# Patient Record
Sex: MALE | URBAN - METROPOLITAN AREA
[De-identification: names, ages, dates, MRNs, and addresses within clinical notes are randomized per-mention and may not be internally consistent; named-entity substitution may affect disease eponyms.]

---

## 2023-02-27 ENCOUNTER — OFFICE VISIT (OUTPATIENT)
Dept: URBAN - METROPOLITAN AREA CLINIC 33 | Facility: CLINIC | Age: 53
End: 2023-02-27
Payer: COMMERCIAL

## 2023-02-27 VITALS
BODY MASS INDEX: 26.48 KG/M2 | DIASTOLIC BLOOD PRESSURE: 77 MMHG | OXYGEN SATURATION: 98 % | HEART RATE: 51 BPM | SYSTOLIC BLOOD PRESSURE: 118 MMHG | WEIGHT: 185 LBS | HEIGHT: 70 IN

## 2023-02-27 DIAGNOSIS — K70.9 ALCOHOLIC LIVER DISEASE: ICD-10-CM

## 2023-02-27 DIAGNOSIS — D64.89 ANEMIA DUE TO OTHER CAUSE, NOT CLASSIFIED: ICD-10-CM

## 2023-02-27 PROBLEM — 271737000: Status: ACTIVE | Noted: 2023-02-27

## 2023-02-27 PROBLEM — 41309000: Status: ACTIVE | Noted: 2023-02-27

## 2023-02-27 PROCEDURE — 99204 OFFICE O/P NEW MOD 45 MIN: CPT | Performed by: INTERNAL MEDICINE

## 2023-02-27 RX ORDER — SPIRONOLACTONE 25 MG/1
1 TABLET TABLET, FILM COATED ORAL
Status: ACTIVE | COMMUNITY

## 2023-02-27 RX ORDER — LEVOTHYROXINE SODIUM 88 UG/1
1 TABLET IN THE MORNING ON AN EMPTY STOMACH TABLET ORAL ONCE A DAY
Status: ACTIVE | COMMUNITY

## 2023-02-27 RX ORDER — ATENOLOL 25 MG/1
1 TABLET TABLET ORAL ONCE A DAY
Status: ACTIVE | COMMUNITY

## 2023-02-27 NOTE — HPI-ABNORMAL LABS
53 y/o male patient presents today for a consultation for abnormal labs, referred by his PCP -  DAHIANA Wheeler .  He denies any previous history of elevated liver enzymes. Patient admits a history of being an alcoholic. He drank 18 drinks for 6 weeks till he entered rehab. Patient states he has enrolled in a detox center where he received treatment for two weeks. Patient states he is currently living in a sobriety center. Patient states his last alcoholic drink was 12.05.2022.    He currently denies any associated symptoms of jaundice, fatigue, dizziness, RUQ pain, bloating, easy bruising or chills. Patient admits symptoms of fatigue, he states his fatigue may be triggered from not having enough rest. Patient states he had a severe allergic reaction to Gabapentin. Patiemt states after consuming the medication he experience severe symptoms of Jaundice and edema in his legs. Patient states after stopping the medication his returned back to normal.   He admits having an ultrasound completed on 01.06.2023 revealing Enlarge, nodular liver woth coarsened echotexture and small volume ascites concerning for cirrhosis. If further assessment of the liver is desired recommend MRI, as MRI is much more sensitive for underlying liver lesions than ultrasounfd, especially in the setting of cirrhosis. Borderline gallbladder wall thickening. Other preserved appearance to the gallbladder with no findigns to suggest biliary obstruction. Ifcomplimentary assessment is desired consider MRI.    Most recent labs were performed 12.29.2022 with findings of WBC - 12.9 (H), RBC - 3.68 (L), Hgb - 12.8 (L), HCT - 35.3 (L), MCH - 34.8 (H), MCHC - 36.1 (H), Neutrophils - 10.1 (H), Glucose - 148 (H), Sodium - 132 (L), Albumin - 3.1 (L), Globulin - 4.6 (H), A/G Ratio - 0.7 (L), Bilirubin - 10.9 (H), ALK Phos - 154 (H), AST - 293 (H), ALT - 149 (H), Vitamin B12 2000 (H), TSH - 5.880 (H), GGT - 144 (H).   Patient also had some labs completed on 01.28.2023 revealing GGT: 117H, Hgb A1c: 5.7H, Glucose: 103H, Creatinine: 0.72L, Calcium: 10.05H, A/G Ratio: 1.0L, Bilirubin, Total: 3.5H, Alk Phosphatase: 145H, AST: 84H, ALT: 67H, all other labs were normal.    RISK FACTORS: Denies Alcohol use, drug use, travel outside the [place], NSAIDs, protein supplements, tattoos, piercings,  service, blood transfusion, family history of liver disease or cancer, personal exposure to liver diseases, residential, rehab

## 2023-03-28 ENCOUNTER — OFFICE VISIT (OUTPATIENT)
Dept: URBAN - METROPOLITAN AREA CLINIC 31 | Facility: CLINIC | Age: 53
End: 2023-03-28

## 2023-05-01 LAB
FERRITIN, SERUM: 299
FOLATE, RBC: 740
HEPATITIS A AB, TOTAL: REACTIVE
HEPATITIS B CORE AB TOTAL: (no result)
HEPATITIS B SURFACE ANTIGEN: (no result)
HEPATITIS C ANTIBODY: (no result)
INDEX: 0.14
INR: 1.2
IRON BIND.CAP.(TIBC): 368
IRON SATURATION: 31
IRON: 113
PT: 12.4
RETICULOCYTE COUNT: 1.4
RETICULOCYTE, ABSOLUTE: (no result)
T-TRANSGLUTAMINASE (TTG) IGA: <1
T-TRANSGLUTAMINASE (TTG) IGG: <1
VITAMIN B12: 739

## 2023-05-03 ENCOUNTER — LAB OUTSIDE AN ENCOUNTER (OUTPATIENT)
Dept: URBAN - METROPOLITAN AREA CLINIC 35 | Facility: CLINIC | Age: 53
End: 2023-05-03

## 2023-05-04 LAB
A/G RATIO: 1.2
ALBUMIN: 3.8
ALKALINE PHOSPHATASE: 144
ALT (SGPT): 37
AST (SGOT): 45
BILIRUBIN, TOTAL: 2.1
BUN/CREATININE RATIO: 30
BUN: 18
CALCIUM: 10.1
CARBON DIOXIDE, TOTAL: 26
CHLORIDE: 102
CREATININE: 0.6
EGFR: 115
GLOBULIN, TOTAL: 3.3
GLUCOSE: 79
POTASSIUM: 4.5
PROTEIN, TOTAL: 7.1
SODIUM: 138

## 2023-05-09 ENCOUNTER — OFFICE VISIT (OUTPATIENT)
Dept: URBAN - METROPOLITAN AREA CLINIC 31 | Facility: CLINIC | Age: 53
End: 2023-05-09
Payer: COMMERCIAL

## 2023-05-09 VITALS
HEART RATE: 61 BPM | DIASTOLIC BLOOD PRESSURE: 70 MMHG | WEIGHT: 182.4 LBS | BODY MASS INDEX: 26.11 KG/M2 | HEIGHT: 70 IN | SYSTOLIC BLOOD PRESSURE: 120 MMHG | OXYGEN SATURATION: 99 %

## 2023-05-09 DIAGNOSIS — Z12.12 ENCOUNTER FOR SCREENING FOR MALIGNANT NEOPLASM OF RECTUM: ICD-10-CM

## 2023-05-09 DIAGNOSIS — K70.30 ALCOHOLIC CIRRHOSIS OF LIVER WITHOUT ASCITES: ICD-10-CM

## 2023-05-09 DIAGNOSIS — D64.89 ANEMIA DUE TO OTHER CAUSE, NOT CLASSIFIED: ICD-10-CM

## 2023-05-09 DIAGNOSIS — K70.9 ALCOHOLIC LIVER DISEASE: ICD-10-CM

## 2023-05-09 DIAGNOSIS — Z12.11 ENCOUNTER FOR SCREENING FOR MALIGNANT NEOPLASM OF COLON: ICD-10-CM

## 2023-05-09 PROBLEM — 420054005: Status: ACTIVE | Noted: 2023-05-09

## 2023-05-09 PROBLEM — 305058001: Status: ACTIVE | Noted: 2023-05-09

## 2023-05-09 PROCEDURE — 99214 OFFICE O/P EST MOD 30 MIN: CPT | Performed by: INTERNAL MEDICINE

## 2023-05-09 RX ORDER — ATENOLOL 25 MG/1
1 TABLET TABLET ORAL ONCE A DAY
Status: ACTIVE | COMMUNITY

## 2023-05-09 RX ORDER — SPIRONOLACTONE 25 MG/1
1 TABLET TABLET, FILM COATED ORAL
Status: ACTIVE | COMMUNITY

## 2023-05-09 RX ORDER — LEVOTHYROXINE SODIUM 88 UG/1
1 TABLET IN THE MORNING ON AN EMPTY STOMACH TABLET ORAL ONCE A DAY
Status: ACTIVE | COMMUNITY

## 2023-05-09 RX ORDER — SODIUM, POTASSIUM,MAG SULFATES 17.5-3.13G
177ML SOLUTION, RECONSTITUTED, ORAL ORAL
Qty: 1 KIT | Refills: 0 | OUTPATIENT
Start: 2023-05-09 | End: 2023-05-10

## 2023-05-09 NOTE — HPI-CIRRHOSIS
He denies any recent episodes of fatigue, easy bruising, decreased appetite, nausea, vomiting, edema, unintentional weight loss, or jaundice. Denies any shortness of breath.    Most recent labs were completed 05.01.2023 and 05.04.2023 which reevealed Creatinine: 0.60L, BUN/Creatinie Ratio: 30H, Bilirubin:2.1H, AST: 45H, INR: 1.2H, PT: 12.4H, Hepatitis A AB, total: Reactive, all other labs were normal.       MELD Score: 11 Complications of liver disease include:  Jaundice:  Yes/No Hepatic Encephalopathy (lose of Brain function due to liver not working) : Yes/No  Ascites (abdominal fluid): Yes/No  Spontaneous Bacterial Peritonitis (ascites fluid infection): Yes/No  Variceal hemorrhage (bleeding within the veins due to high B/P): Yes/No  Portal Vein Thrombosis (blockage or narrowing of the portal vein): Yes/No  Muscle Mass Loss: Yes/No  Weight Loss: Yes/No  Sleeping habits: -- hours per night.

## 2023-05-09 NOTE — HPI-ELEVATED LIVER ENZYMES
Patient present today for 3 month follow-up of  elevated liver enzymes. He denies any associated symptoms of  jaundice, fatigue, dizziness, RUQ pain, bloating, easy bruising or chills. Patient admits symptoms of joint pain and muscle fatigue. he states the joint pain and muscle fatigue is mainly present in his quads and shoulders.  Patient states he noticed the symptoms aout 5-6 weeks ago. Patient states he was diagnose with Gilbert's Syndrome back in 1984.   Most recent labs were performed 05.01.2023 and 05.04.2023 which reevealed Creatinine: 0.60L, BUN/Creatinie Ratio: 30H, Bilirubin:2.1H, AST: 45H, INR: 1.2H, PT: 12.4H, Hepatitis A AB, total: Reactive, all other labs were normal.   (Last Visit 02.27.2023) 51 y/o male patient presents today for a consultation for abnormal labs, referred by his PCP -  DAHIANA Wheeler .  He denies any previous history of elevated liver enzymes. Patient admits a history of being an alcoholic. He drank 18 drinks for 6 weeks till he entered rehab. Patient states he has enrolled in a detox center where he received treatment for two weeks. Patient states he is currently living in a sobriety center. Patient states his last alcoholic drink was 12.05.2022.    He currently denies any associated symptoms of jaundice, fatigue, dizziness, RUQ pain, bloating, easy bruising or chills. Patient admits symptoms of fatigue, he states his fatigue may be triggered from not having enough rest. Patient states he had a severe allergic reaction to Gabapentin. Patiemt states after consuming the medication he experience severe symptoms of Jaundice and edema in his legs. Patient states after stopping the medication his returned back to normal.   He admits having an ultrasound completed on 01.06.2023 revealing Enlarge, nodular liver woth coarsened echotexture and small volume ascites concerning for cirrhosis. If further assessment of the liver is desired recommend MRI, as MRI is much more sensitive for underlying liver lesions than ultrasounfd, especially in the setting of cirrhosis. Borderline gallbladder wall thickening. Other preserved appearance to the gallbladder with no findigns to suggest biliary obstruction. Ifcomplimentary assessment is desired consider MRI.    Most recent labs were performed 12.29.2022 with findings of WBC - 12.9 (H), RBC - 3.68 (L), Hgb - 12.8 (L), HCT - 35.3 (L), MCH - 34.8 (H), MCHC - 36.1 (H), Neutrophils - 10.1 (H), Glucose - 148 (H), Sodium - 132 (L), Albumin - 3.1 (L), Globulin - 4.6 (H), A/G Ratio - 0.7 (L), Bilirubin - 10.9 (H), ALK Phos - 154 (H), AST - 293 (H), ALT - 149 (H), Vitamin B12 2000 (H), TSH - 5.880 (H), GGT - 144 (H).   Patient also had some labs completed on 01.28.2023 revealing GGT: 117H, Hgb A1c: 5.7H, Glucose: 103H, Creatinine: 0.72L, Calcium: 10.05H, A/G Ratio: 1.0L, Bilirubin, Total: 3.5H, Alk Phosphatase: 145H, AST: 84H, ALT: 67H, all other labs were normal.    RISK FACTORS: Denies Alcohol use, drug use, travel outside the [place], NSAIDs, protein supplements, tattoos, piercings,  service, blood transfusion, family history of liver disease or cancer, personal exposure to liver diseases, correction, rehab

## 2023-05-11 ENCOUNTER — TELEPHONE ENCOUNTER (OUTPATIENT)
Dept: URBAN - METROPOLITAN AREA CLINIC 36 | Facility: CLINIC | Age: 53
End: 2023-05-11

## 2023-05-11 ENCOUNTER — TELEPHONE ENCOUNTER (OUTPATIENT)
Dept: URBAN - METROPOLITAN AREA CLINIC 33 | Facility: CLINIC | Age: 53
End: 2023-05-11

## 2023-05-17 ENCOUNTER — TELEPHONE ENCOUNTER (OUTPATIENT)
Dept: URBAN - METROPOLITAN AREA CLINIC 31 | Facility: CLINIC | Age: 53
End: 2023-05-17

## 2023-05-17 RX ORDER — SODIUM, POTASSIUM,MAG SULFATES 17.5-3.13G
177ML SOLUTION, RECONSTITUTED, ORAL ORAL ONCE A DAY
Qty: 354 ML | Refills: 0
Start: 2023-05-09 | End: 2023-05-21

## 2023-06-06 ENCOUNTER — OFFICE VISIT (OUTPATIENT)
Dept: URBAN - METROPOLITAN AREA MEDICAL CENTER 10 | Facility: MEDICAL CENTER | Age: 53
End: 2023-06-06
Payer: COMMERCIAL

## 2023-06-06 DIAGNOSIS — K74.69 CIRRHOSIS, CRYPTOGENIC: ICD-10-CM

## 2023-06-06 DIAGNOSIS — K31.89 ACQUIRED DEFORMITY OF DUODENUM: ICD-10-CM

## 2023-06-06 DIAGNOSIS — Z12.11 COLON CANCER SCREENING: ICD-10-CM

## 2023-06-06 DIAGNOSIS — I85.10 ESOPH VARICE OTHER DIS: ICD-10-CM

## 2023-06-06 DIAGNOSIS — D12.0 ADENOMA OF CECUM: ICD-10-CM

## 2023-06-06 PROCEDURE — 45380 COLONOSCOPY AND BIOPSY: CPT | Performed by: INTERNAL MEDICINE

## 2023-06-06 PROCEDURE — 43239 EGD BIOPSY SINGLE/MULTIPLE: CPT | Performed by: INTERNAL MEDICINE

## 2023-07-11 ENCOUNTER — OFFICE VISIT (OUTPATIENT)
Dept: URBAN - METROPOLITAN AREA CLINIC 31 | Facility: CLINIC | Age: 53
End: 2023-07-11

## 2023-07-11 ENCOUNTER — TELEPHONE ENCOUNTER (OUTPATIENT)
Dept: URBAN - METROPOLITAN AREA CLINIC 35 | Facility: CLINIC | Age: 53
End: 2023-07-11

## 2023-07-11 RX ORDER — ATENOLOL 25 MG/1
1 TABLET TABLET ORAL ONCE A DAY
Status: ACTIVE | COMMUNITY

## 2023-07-11 RX ORDER — LEVOTHYROXINE SODIUM 88 UG/1
1 TABLET IN THE MORNING ON AN EMPTY STOMACH TABLET ORAL ONCE A DAY
Status: ACTIVE | COMMUNITY

## 2023-07-11 RX ORDER — SPIRONOLACTONE 25 MG/1
1 TABLET TABLET, FILM COATED ORAL
Status: ACTIVE | COMMUNITY

## 2023-07-11 NOTE — HPI-CIRRHOSIS
Patient states he has/not had any recent episodes of fatigue, easy bruising, decreased appetite, nausea, vomiting, edema, unintentional weight loss, or jaundice.  Admits/Denies any shortness of breath.    Most recent labs were completed --.    MELD Score:  Complications of liver disease include:  Jaundice:   Hepatic Encephalopathy (lose of Brain function due to liver not working) : Yes/No  Ascites (abdominal fluid): Yes/No  Spontaneous Bacterial Peritonitis (ascites fluid infection): Yes/No  Variceal hemorrhage (bleeding within the veins due to high B/P): Yes/No  Portal Vein Thrombosis (blockage or narrowing of the portal vein): Yes/No  Muscle Mass Loss: Yes/No  Weight Loss: Yes/No  Sleeping habits: -- hours per night.      (Last Visit 05.09.2023) He denies any recent episodes of fatigue, easy bruising, decreased appetite, nausea, vomiting, edema, unintentional weight loss, or jaundice. Denies any shortness of breath.    Most recent labs were completed 05.01.2023 and 05.04.2023 which reevealed Creatinine: 0.60L, BUN/Creatinie Ratio: 30H, Bilirubin:2.1H, AST: 45H, INR: 1.2H, PT: 12.4H, Hepatitis A AB, total: Reactive, all other labs were normal.       MELD Score: 11 Complications of liver disease include:  Jaundice:  Yes/No Hepatic Encephalopathy (lose of Brain function due to liver not working) : Yes/No  Ascites (abdominal fluid): Yes/No  Spontaneous Bacterial Peritonitis (ascites fluid infection): Yes/No  Variceal hemorrhage (bleeding within the veins due to high B/P): Yes/No  Portal Vein Thrombosis (blockage or narrowing of the portal vein): Yes/No  Muscle Mass Loss: Yes/No  Weight Loss: Yes/No  Sleeping habits: -- hours per night.

## 2023-07-11 NOTE — HPI-COLONOSCOPY FOLLOWUP
Patient presents today for a follow up from his colonoscopy. He admits/denies any complications after his procedure. Currently reports -- bowel movements per day, with/out strain. His stools are _  with/out the presence of blood, mucus, and melena. Admits/Denies any pruritus ani or rectal pain.

## 2023-07-11 NOTE — HPI-ENDOSCOPY (EGD) FOLLOWUP
A catheter (Catheter 6fr Jr5 Crv 100cm [facility])  was used to cross the aortic valve and perform left ventriculography by hand injection.  He admits/denies any complications after his procedure. Since his procedure he admits/denies any recent episodes of dysphagia, globus, a change in appetite or bowel habits.

## 2023-07-11 NOTE — HPI-ELEVATED LIVER ENZYMES
He admits/denies any recent episodes of jaundice, fatigue, dizziness, RUQ pain, bloating, easy bruising or chills.   Most recent labs were performed (--).    (Last Visit 05.09.2023) Patient present today for 3 month follow-up of  elevated liver enzymes. He denies any associated symptoms of  jaundice, fatigue, dizziness, RUQ pain, bloating, easy bruising or chills. Patient admits symptoms of joint pain and muscle fatigue. he states the joint pain and muscle fatigue is mainly present in his quads and shoulders.  Patient states he noticed the symptoms aout 5-6 weeks ago. Patient states he was diagnose with Gilbert's Syndrome back in 1984.   Most recent labs were performed 05.01.2023 and 05.04.2023 which reevealed Creatinine: 0.60L, BUN/Creatinie Ratio: 30H, Bilirubin:2.1H, AST: 45H, INR: 1.2H, PT: 12.4H, Hepatitis A AB, total: Reactive, all other labs were normal.   (Last Visit 02.27.2023) 51 y/o male patient presents today for a consultation for abnormal labs, referred by his PCP -  DAHIANA Wheeler .  He denies any previous history of elevated liver enzymes. Patient admits a history of being an alcoholic. He drank 18 drinks for 6 weeks till he entered rehab. Patient states he has enrolled in a detox center where he received treatment for two weeks. Patient states he is currently living in a sobriety center. Patient states his last alcoholic drink was 12.05.2022.    He currently denies any associated symptoms of jaundice, fatigue, dizziness, RUQ pain, bloating, easy bruising or chills. Patient admits symptoms of fatigue, he states his fatigue may be triggered from not having enough rest. Patient states he had a severe allergic reaction to Gabapentin. Patiemt states after consuming the medication he experience severe symptoms of Jaundice and edema in his legs. Patient states after stopping the medication his returned back to normal.   He admits having an ultrasound completed on 01.06.2023 revealing Enlarge, nodular liver woth coarsened echotexture and small volume ascites concerning for cirrhosis. If further assessment of the liver is desired recommend MRI, as MRI is much more sensitive for underlying liver lesions than ultrasounfd, especially in the setting of cirrhosis. Borderline gallbladder wall thickening. Other preserved appearance to the gallbladder with no findigns to suggest biliary obstruction. Ifcomplimentary assessment is desired consider MRI.    Most recent labs were performed 12.29.2022 with findings of WBC - 12.9 (H), RBC - 3.68 (L), Hgb - 12.8 (L), HCT - 35.3 (L), MCH - 34.8 (H), MCHC - 36.1 (H), Neutrophils - 10.1 (H), Glucose - 148 (H), Sodium - 132 (L), Albumin - 3.1 (L), Globulin - 4.6 (H), A/G Ratio - 0.7 (L), Bilirubin - 10.9 (H), ALK Phos - 154 (H), AST - 293 (H), ALT - 149 (H), Vitamin B12 2000 (H), TSH - 5.880 (H), GGT - 144 (H).   Patient also had some labs completed on 01.28.2023 revealing GGT: 117H, Hgb A1c: 5.7H, Glucose: 103H, Creatinine: 0.72L, Calcium: 10.05H, A/G Ratio: 1.0L, Bilirubin, Total: 3.5H, Alk Phosphatase: 145H, AST: 84H, ALT: 67H, all other labs were normal.    RISK FACTORS: Denies Alcohol use, drug use, travel outside the [place], NSAIDs, protein supplements, tattoos, piercings,  service, blood transfusion, family history of liver disease or cancer, personal exposure to liver diseases, shelter, rehab

## 2023-07-18 ENCOUNTER — ERX REFILL RESPONSE (OUTPATIENT)
Dept: URBAN - METROPOLITAN AREA CLINIC 33 | Facility: CLINIC | Age: 53
End: 2023-07-18

## 2023-07-18 ENCOUNTER — TELEPHONE ENCOUNTER (OUTPATIENT)
Dept: URBAN - METROPOLITAN AREA CLINIC 33 | Facility: CLINIC | Age: 53
End: 2023-07-18

## 2023-07-18 ENCOUNTER — OFFICE VISIT (OUTPATIENT)
Dept: URBAN - METROPOLITAN AREA CLINIC 33 | Facility: CLINIC | Age: 53
End: 2023-07-18
Payer: COMMERCIAL

## 2023-07-18 VITALS
OXYGEN SATURATION: 97 % | WEIGHT: 182 LBS | SYSTOLIC BLOOD PRESSURE: 110 MMHG | HEIGHT: 70 IN | HEART RATE: 62 BPM | DIASTOLIC BLOOD PRESSURE: 80 MMHG | BODY MASS INDEX: 26.05 KG/M2

## 2023-07-18 DIAGNOSIS — K70.30 ALC (ALCOHOLIC LIVER CIRRHOSIS): ICD-10-CM

## 2023-07-18 DIAGNOSIS — I85.00 ESOPHAGEAL VARICES DETERMINED BY ENDOSCOPY: ICD-10-CM

## 2023-07-18 DIAGNOSIS — D36.9 ADENOMATOUS POLYP: ICD-10-CM

## 2023-07-18 PROBLEM — 28670008: Status: ACTIVE | Noted: 2023-07-18

## 2023-07-18 PROCEDURE — 99214 OFFICE O/P EST MOD 30 MIN: CPT | Performed by: STUDENT IN AN ORGANIZED HEALTH CARE EDUCATION/TRAINING PROGRAM

## 2023-07-18 RX ORDER — CARVEDILOL 3.12 MG/1
1 TABLET WITH FOOD TABLET, FILM COATED ORAL TWICE A DAY
Qty: 60 | Refills: 5 | OUTPATIENT
Start: 2023-07-18

## 2023-07-18 RX ORDER — SPIRONOLACTONE 25 MG/1
1 TABLET TABLET, FILM COATED ORAL ONCE A DAY
OUTPATIENT

## 2023-07-18 RX ORDER — ATENOLOL 25 MG/1
1 TABLET TABLET ORAL ONCE A DAY
OUTPATIENT

## 2023-07-18 RX ORDER — SPIRONOLACTONE 25 MG/1
1 TABLET TABLET, FILM COATED ORAL ONCE A DAY
Status: ACTIVE | COMMUNITY

## 2023-07-18 RX ORDER — ATENOLOL 25 MG/1
1 TABLET TABLET ORAL ONCE A DAY
Status: ACTIVE | COMMUNITY

## 2023-07-18 RX ORDER — CARVEDILOL 3.12 MG/1
1 TABLET WITH FOOD TABLET, FILM COATED ORAL TWICE A DAY
Qty: 60 | Refills: 5 | OUTPATIENT
Start: 2023-07-18 | End: 2023-07-18

## 2023-07-18 RX ORDER — LEVOTHYROXINE SODIUM 88 UG/1
1 TABLET IN THE MORNING ON AN EMPTY STOMACH TABLET ORAL ONCE A DAY
Status: ACTIVE | COMMUNITY

## 2023-07-18 NOTE — HPI-TODAY'S VISIT:
52 year old male patient presents today for a follow-up post Colon/EGD, he denies complications following procedures that was completed on 06/06/with findings as below. 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 Reports abstinence from alcohol Dec 5, 2022.. No overt bleeding, no abdominal distention.  No LE swelling.  Weight, appetite are stable. Hx mild fluid on initial US and on aldactone low dose 25 mg. He is staying active, exercising.   Colonoscopy: - Perianal skin tags found on perianal exam - One 4 to 5 mm Tubular adenoma polyp in the cecum - Diverticulosis in the sigmoid colon - Moderate colonic spasm suspicious for irritable bowel syndrome - Non-bleeding internal hemorrhoids  EGD: Grade 2 EV no stigmata erythematous gastric mucosa - Patchy mildly ectatic lamina propria vasculature.  MRI abdomen with and without contrast 4-7-2023 Liver:The liver is normal in size liver surface is slightly nodular.  Mild diffuse hepatic steatosis is seen.  Visualized portal hepatic veins are patent. Impression:Liver surface nodularity compatible with hepatic cirrhosis.  Recannulized umbilical and periumbilical veins, sign of portal hypertension.  No suspicious liver lesions appreciated.  Labs from 5-3-2023 Creatinine 1.6 Total bili 2.1 AST 45, 37ALT Labs 4- Iron saturation 31, TIBC 360, iron total 113 INR 1.2 Hep A total reactive Hep B surface antigen negative Hep B core antibody negative Hep C antibody negative B12.Folat normal.

## 2023-07-18 NOTE — HPI-TODAY'S VISIT:
MRI abdomen with and without contrast 4-7-2023 Liver:The liver is normal in size liver surface is slightly nodular.  Mild diffuse hepatic steatosis is seen.  Visualized portal hepatic veins are patent. Impression:Liver surface nodularity compatible with hepatic cirrhosis.  Recannulized umbilical and periumbilical veins, sign of portal hypertension.  No suspicious liver lesions appreciated.  Labs from 5-3-2023 Creatinine 1.6 Total bili 2.1 AST 45, 37ALT Labs 4- Iron saturation 31, TIBC 360, iron total 113 INR 1.2 Hep A total reactive Hep B surface antigen negative Hep B core antibody negative Hep C antibody negative B12.Folate normal.  Josi Crowley, PCP managing atenolol

## 2023-10-19 ENCOUNTER — OFFICE VISIT (OUTPATIENT)
Dept: URBAN - METROPOLITAN AREA CLINIC 33 | Facility: CLINIC | Age: 53
End: 2023-10-19

## 2023-11-06 ENCOUNTER — LAB OUTSIDE AN ENCOUNTER (OUTPATIENT)
Dept: URBAN - METROPOLITAN AREA CLINIC 31 | Facility: CLINIC | Age: 53
End: 2023-11-06

## 2023-11-20 ENCOUNTER — OFFICE VISIT (OUTPATIENT)
Dept: URBAN - METROPOLITAN AREA CLINIC 33 | Facility: CLINIC | Age: 53
End: 2023-11-20
Payer: COMMERCIAL

## 2023-11-20 ENCOUNTER — DASHBOARD ENCOUNTERS (OUTPATIENT)
Age: 53
End: 2023-11-20

## 2023-11-20 VITALS
HEART RATE: 56 BPM | OXYGEN SATURATION: 99 % | WEIGHT: 182 LBS | BODY MASS INDEX: 26.05 KG/M2 | HEIGHT: 70 IN | SYSTOLIC BLOOD PRESSURE: 118 MMHG | DIASTOLIC BLOOD PRESSURE: 70 MMHG

## 2023-11-20 DIAGNOSIS — I85.00 ESOPHAGEAL VARICES DETERMINED BY ENDOSCOPY: ICD-10-CM

## 2023-11-20 DIAGNOSIS — K57.30 DIVERTICULOSIS OF SIGMOID COLON: ICD-10-CM

## 2023-11-20 DIAGNOSIS — D12.0 BENIGN NEOPLASM OF CECUM: ICD-10-CM

## 2023-11-20 DIAGNOSIS — D64.89 ANEMIA DUE TO OTHER CAUSE, NOT CLASSIFIED: ICD-10-CM

## 2023-11-20 DIAGNOSIS — D36.9 ADENOMATOUS POLYP: ICD-10-CM

## 2023-11-20 DIAGNOSIS — K70.30 ALCOHOLIC CIRRHOSIS OF LIVER WITHOUT ASCITES: ICD-10-CM

## 2023-11-20 PROBLEM — 441456002: Status: ACTIVE | Noted: 2023-11-20

## 2023-11-20 PROCEDURE — 99214 OFFICE O/P EST MOD 30 MIN: CPT | Performed by: INTERNAL MEDICINE

## 2023-11-20 RX ORDER — LEVOTHYROXINE SODIUM 88 UG/1
1 TABLET IN THE MORNING ON AN EMPTY STOMACH TABLET ORAL ONCE A DAY
Status: ACTIVE | COMMUNITY

## 2023-11-20 RX ORDER — AMLODIPINE BESYLATE 2.5 MG/1
1 TABLET TABLET ORAL ONCE A DAY
Status: ACTIVE | COMMUNITY

## 2023-11-20 RX ORDER — PRASUGREL 10 MG/1
AS DIRECTED TABLET, FILM COATED ORAL
Status: ACTIVE | COMMUNITY

## 2023-11-20 RX ORDER — ALIROCUMAB 75 MG/ML
AS DIRECTED INJECTION, SOLUTION SUBCUTANEOUS
Status: ACTIVE | COMMUNITY

## 2023-11-20 RX ORDER — CARVEDILOL 3.12 MG/1
1 TABLET WITH FOOD TABLET, FILM COATED ORAL TWICE A DAY
Status: ACTIVE | COMMUNITY

## 2023-11-20 RX ORDER — CARVEDILOL 3.12 MG/1
1 TABLET WITH FOOD TABLET, FILM COATED ORAL TWICE A DAY
Qty: 60 | Refills: 5 | OUTPATIENT

## 2023-11-20 NOTE — HPI-CIRRHOSIS
Patient states he has not had any recent episodes of fatigue, easy bruising, decreased appetite, nausea, vomiting, edema, unintentional weight loss, or jaundice.  Admits/Denies any shortness of breath.    Most recent labs were completed  07.06.2023 which revealed Calcium: 10.4H, Bilirubin,Total: 2.1H, Alkaline Phosphatase: 175H, AST: 67H, ALT: 63H, all other labs were normal.     MELD Score:11 (last visit) Complications of liver disease include:No Jaundice:No Hepatic Encephalopathy (lose of Brain function due to liver not working) : No  Ascites (abdominal fluid): No  Spontaneous Bacterial Peritonitis (ascites fluid infection): No  Variceal hemorrhage (bleeding within the veins due to high B/P): No  Portal Vein Thrombosis (blockage or narrowing of the portal vein): No  Muscle Mass Loss: No  Weight Loss: No  Sleeping habits: 7-71/2 hours per night.      (Last Visit 05.09.2023) He denies any recent episodes of fatigue, easy bruising, decreased appetite, nausea, vomiting, edema, unintentional weight loss, or jaundice. Denies any shortness of breath.    Most recent labs were completed 05.01.2023 and 05.04.2023 which reevealed Creatinine: 0.60L, BUN/Creatinie Ratio: 30H, Bilirubin:2.1H, AST: 45H, INR: 1.2H, PT: 12.4H, Hepatitis A AB, total: Reactive, all other labs were normal.       MELD Score: 11 Complications of liver disease include:  Jaundice:  Yes/No Hepatic Encephalopathy (lose of Brain function due to liver not working) : Yes/No  Ascites (abdominal fluid): Yes/No  Spontaneous Bacterial Peritonitis (ascites fluid infection): Yes/No  Variceal hemorrhage (bleeding within the veins due to high B/P): Yes/No  Portal Vein Thrombosis (blockage or narrowing of the portal vein): Yes/No  Muscle Mass Loss: Yes/No  Weight Loss: Yes/No  Sleeping habits: -- hours per night. (1) Other Medications/None

## 2023-11-20 NOTE — HPI-ELEVATED LIVER ENZYMES
Patient present today for a 3 month follow-up of elevated liver enzymes. He denies any recent episodes of jaundice,  dizziness, RUQ pain, bloating, easy bruising or chills. Patient states he does experience fatigue on occasions.   Most recent labs were performed 07.06.2023 which revealed Calcium: 10.4H, Bilirubin,Total: 2.1H, Alkaline Phosphatase: 175H, AST: 67H, ALT: 63H, all other labs were normal.   (Last Visit 05.09.2023) Patient present today for 3 month follow-up of  elevated liver enzymes. He denies any associated symptoms of  jaundice, fatigue, dizziness, RUQ pain, bloating, easy bruising or chills. Patient admits symptoms of joint pain and muscle fatigue. he states the joint pain and muscle fatigue is mainly present in his quads and shoulders.  Patient states he noticed the symptoms aout 5-6 weeks ago. Patient states he was diagnose with Gilbert's Syndrome back in 1984.   Most recent labs were performed 05.01.2023 and 05.04.2023 which reevealed Creatinine: 0.60L, BUN/Creatinie Ratio: 30H, Bilirubin:2.1H, AST: 45H, INR: 1.2H, PT: 12.4H, Hepatitis A AB, total: Reactive, all other labs were normal.   (Last Visit 02.27.2023) 51 y/o male patient presents today for a consultation for abnormal labs, referred by his PCP -  DAHIANA Wheeler .  He denies any previous history of elevated liver enzymes. Patient admits a history of being an alcoholic. He drank 18 drinks for 6 weeks till he entered rehab. Patient states he has enrolled in a detox center where he received treatment for two weeks. Patient states he is currently living in a sobriety center. Patient states his last alcoholic drink was 12.05.2022.    He currently denies any associated symptoms of jaundice, fatigue, dizziness, RUQ pain, bloating, easy bruising or chills. Patient admits symptoms of fatigue, he states his fatigue may be triggered from not having enough rest. Patient states he had a severe allergic reaction to Gabapentin. Patiemt states after consuming the medication he experience severe symptoms of Jaundice and edema in his legs. Patient states after stopping the medication his returned back to normal.   He admits having an ultrasound completed on 01.06.2023 revealing Enlarge, nodular liver woth coarsened echotexture and small volume ascites concerning for cirrhosis. If further assessment of the liver is desired recommend MRI, as MRI is much more sensitive for underlying liver lesions than ultrasounfd, especially in the setting of cirrhosis. Borderline gallbladder wall thickening. Other preserved appearance to the gallbladder with no findigns to suggest biliary obstruction. Ifcomplimentary assessment is desired consider MRI.    Most recent labs were performed 12.29.2022 with findings of WBC - 12.9 (H), RBC - 3.68 (L), Hgb - 12.8 (L), HCT - 35.3 (L), MCH - 34.8 (H), MCHC - 36.1 (H), Neutrophils - 10.1 (H), Glucose - 148 (H), Sodium - 132 (L), Albumin - 3.1 (L), Globulin - 4.6 (H), A/G Ratio - 0.7 (L), Bilirubin - 10.9 (H), ALK Phos - 154 (H), AST - 293 (H), ALT - 149 (H), Vitamin B12 2000 (H), TSH - 5.880 (H), GGT - 144 (H).   Patient also had some labs completed on 01.28.2023 revealing GGT: 117H, Hgb A1c: 5.7H, Glucose: 103H, Creatinine: 0.72L, Calcium: 10.05H, A/G Ratio: 1.0L, Bilirubin, Total: 3.5H, Alk Phosphatase: 145H, AST: 84H, ALT: 67H, all other labs were normal.    RISK FACTORS: Denies Alcohol use, drug use, travel outside the [place], NSAIDs, protein supplements, tattoos, piercings,  service, blood transfusion, family history of liver disease or cancer, personal exposure to liver diseases, longterm, rehab

## 2023-11-21 ENCOUNTER — TELEPHONE ENCOUNTER (OUTPATIENT)
Dept: URBAN - METROPOLITAN AREA CLINIC 35 | Facility: CLINIC | Age: 53
End: 2023-11-21

## 2023-11-21 ENCOUNTER — ERX REFILL RESPONSE (OUTPATIENT)
Dept: URBAN - METROPOLITAN AREA CLINIC 33 | Facility: CLINIC | Age: 53
End: 2023-11-21

## 2023-11-21 LAB
A/G RATIO: 1.4
ABSOLUTE BASOPHILS: 48
ABSOLUTE EOSINOPHILS: 217
ABSOLUTE LYMPHOCYTES: 1797
ABSOLUTE MONOCYTES: 488
ABSOLUTE NEUTROPHILS: 2751
AFP, SERUM, TUMOR MARKER: 4
ALBUMIN: 4
ALKALINE PHOSPHATASE: 124
ALT (SGPT): 30
AST (SGOT): 37
BASOPHILS: 0.9
BILIRUBIN, TOTAL: 1.7
BUN/CREATININE RATIO: (no result)
BUN: 14
CALCIUM: 9.6
CARBON DIOXIDE, TOTAL: 28
CHLORIDE: 103
CREATININE: 0.8
EGFR: 105
EOSINOPHILS: 4.1
GLOBULIN, TOTAL: 2.9
GLUCOSE: 84
HEMATOCRIT: 39.1
HEMOGLOBIN: 12.9
INR: 1.2
LYMPHOCYTES: 33.9
MCH: 30
MCHC: 33
MCV: 90.9
MONOCYTES: 9.2
MPV: 11
NEUTROPHILS: 51.9
PLATELET COUNT: 177
POTASSIUM: 4.2
PROTEIN, TOTAL: 6.9
PT: 12.2
RDW: 12.4
RED BLOOD CELL COUNT: 4.3
SODIUM: 141
WHITE BLOOD CELL COUNT: 5.3

## 2023-11-21 RX ORDER — CARVEDILOL 3.12 MG/1
TAKE 1 TABLET BY MOUTH TWICE A DAY WITH FOOD FOR 30 DAYS TABLET, FILM COATED ORAL
Qty: 180 TABLET | Refills: 1 | OUTPATIENT

## 2023-11-21 RX ORDER — CARVEDILOL 3.12 MG/1
1 TABLET WITH FOOD TABLET, FILM COATED ORAL TWICE A DAY
Qty: 60 | Refills: 5 | OUTPATIENT

## 2023-11-30 ENCOUNTER — TELEPHONE ENCOUNTER (OUTPATIENT)
Dept: URBAN - METROPOLITAN AREA CLINIC 36 | Facility: CLINIC | Age: 53
End: 2023-11-30

## 2024-05-13 ENCOUNTER — TELEPHONE ENCOUNTER (OUTPATIENT)
Dept: URBAN - METROPOLITAN AREA CLINIC 36 | Facility: CLINIC | Age: 54
End: 2024-05-13

## 2024-05-15 ENCOUNTER — ERX REFILL RESPONSE (OUTPATIENT)
Dept: URBAN - METROPOLITAN AREA CLINIC 33 | Facility: CLINIC | Age: 54
End: 2024-05-15

## 2024-05-15 RX ORDER — CARVEDILOL 3.12 MG/1
1 TABLET WITH FOOD TABLET, FILM COATED ORAL TWICE A DAY
Qty: 180 TABLET | Refills: 3 | OUTPATIENT

## 2024-05-15 RX ORDER — CARVEDILOL 3.12 MG/1
TAKE 1 TABLET BY MOUTH TWICE A DAY WITH FOOD FOR 30 DAYS TABLET, FILM COATED ORAL
Qty: 180 TABLET | Refills: 1 | OUTPATIENT

## 2024-05-20 ENCOUNTER — LAB OUTSIDE AN ENCOUNTER (OUTPATIENT)
Dept: URBAN - METROPOLITAN AREA CLINIC 33 | Facility: CLINIC | Age: 54
End: 2024-05-20

## 2024-05-28 LAB
A/G RATIO: 1.6
AFP, SERUM, TUMOR MARKER: 3.8
ALBUMIN: 4.2
ALKALINE PHOSPHATASE: 81
ALT (SGPT): 25
AST (SGOT): 34
BILIRUBIN, TOTAL: 2.5
BUN/CREATININE RATIO: (no result)
BUN: 13
CALCIUM: 9.6
CARBON DIOXIDE, TOTAL: 27
CHLORIDE: 104
CREATININE: 0.82
EGFR: 104
GLOBULIN, TOTAL: 2.6
GLUCOSE: 78
INR: 1.1
POTASSIUM: 3.9
PROTEIN, TOTAL: 6.8
PT: 11.4
SODIUM: 141

## 2024-06-03 ENCOUNTER — OFFICE VISIT (OUTPATIENT)
Dept: URBAN - METROPOLITAN AREA CLINIC 33 | Facility: CLINIC | Age: 54
End: 2024-06-03
Payer: COMMERCIAL

## 2024-06-03 VITALS
WEIGHT: 182.4 LBS | SYSTOLIC BLOOD PRESSURE: 116 MMHG | BODY MASS INDEX: 26.11 KG/M2 | HEIGHT: 70 IN | OXYGEN SATURATION: 98 % | DIASTOLIC BLOOD PRESSURE: 78 MMHG

## 2024-06-03 DIAGNOSIS — K57.30 DIVERTICULOSIS OF SIGMOID COLON: ICD-10-CM

## 2024-06-03 DIAGNOSIS — I85.00 ESOPHAGEAL VARICES DETERMINED BY ENDOSCOPY: ICD-10-CM

## 2024-06-03 DIAGNOSIS — D36.9 ADENOMATOUS POLYP: ICD-10-CM

## 2024-06-03 DIAGNOSIS — K70.30 ALCOHOLIC CIRRHOSIS OF LIVER WITHOUT ASCITES: ICD-10-CM

## 2024-06-03 PROCEDURE — 99214 OFFICE O/P EST MOD 30 MIN: CPT | Performed by: INTERNAL MEDICINE

## 2024-06-03 RX ORDER — ALIROCUMAB 75 MG/ML
AS DIRECTED INJECTION, SOLUTION SUBCUTANEOUS
Status: ACTIVE | COMMUNITY

## 2024-06-03 RX ORDER — PRASUGREL 10 MG/1
AS DIRECTED TABLET, FILM COATED ORAL
Status: ACTIVE | COMMUNITY

## 2024-06-03 RX ORDER — LEVOTHYROXINE SODIUM 88 UG/1
1 TABLET IN THE MORNING ON AN EMPTY STOMACH TABLET ORAL ONCE A DAY
Status: ACTIVE | COMMUNITY

## 2024-06-03 RX ORDER — AMLODIPINE BESYLATE 2.5 MG/1
1 TABLET TABLET ORAL ONCE A DAY
Status: ACTIVE | COMMUNITY

## 2024-06-03 RX ORDER — CARVEDILOL 3.12 MG/1
1 TABLET WITH FOOD TABLET, FILM COATED ORAL TWICE A DAY
Qty: 180 TABLET | Refills: 3 | Status: ACTIVE | COMMUNITY

## 2024-06-03 RX ORDER — CARVEDILOL 3.12 MG/1
1 TABLET WITH FOOD TABLET, FILM COATED ORAL TWICE A DAY
Qty: 60 | Refills: 5 | OUTPATIENT

## 2024-06-03 NOTE — HPI-CIRRHOSIS
He denies any associated symptoms of fatigue, easy bruising, decreased appetite, nausea, vomiting, edema, unintentional weight loss, shortness of breath, or jaundice.   Most recent labs were completed 05.24.2024 with results listed below.     MELD Score: 11 Complications of liver disease include: No Jaundice: No Hepatic Encephalopathy (lose of Brain function due to liver not working) : No  Ascites (abdominal fluid): No  Spontaneous Bacterial Peritonitis (ascites fluid infection): No  Variceal hemorrhage (bleeding within the veins due to high B/P): No  Portal Vein Thrombosis (blockage or narrowing of the portal vein): No  Muscle Mass Loss: No  Weight Loss: No  Sleeping habits: 7-71/2 hours per night.    (Last Visit 11.20.2023) Patient states he has not had any recent episodes of fatigue, easy bruising, decreased appetite, nausea, vomiting, edema, unintentional weight loss, or jaundice.  Admits/Denies any shortness of breath.    Most recent labs were completed  07.06.2023 which revealed Calcium: 10.4H, Bilirubin,Total: 2.1H, Alkaline Phosphatase: 175H, AST: 67H, ALT: 63H, all other labs were normal.     MELD Score:11 (last visit) Complications of liver disease include:No Jaundice:No Hepatic Encephalopathy (lose of Brain function due to liver not working) : No  Ascites (abdominal fluid): No  Spontaneous Bacterial Peritonitis (ascites fluid infection): No  Variceal hemorrhage (bleeding within the veins due to high B/P): No  Portal Vein Thrombosis (blockage or narrowing of the portal vein): No  Muscle Mass Loss: No  Weight Loss: No  Sleeping habits: 7-71/2 hours per night.      (Last Visit 05.09.2023) He denies any recent episodes of fatigue, easy bruising, decreased appetite, nausea, vomiting, edema, unintentional weight loss, or jaundice. Denies any shortness of breath.    Most recent labs were completed 05.01.2023 and 05.04.2023 which reevealed Creatinine: 0.60L, BUN/Creatinie Ratio: 30H, Bilirubin:2.1H, AST: 45H, INR: 1.2H, PT: 12.4H, Hepatitis A AB, total: Reactive, all other labs were normal.       MELD Score: 11 Complications of liver disease include:  Jaundice:  Yes/No Hepatic Encephalopathy (lose of Brain function due to liver not working) : Yes/No  Ascites (abdominal fluid): Yes/No  Spontaneous Bacterial Peritonitis (ascites fluid infection): Yes/No  Variceal hemorrhage (bleeding within the veins due to high B/P): Yes/No  Portal Vein Thrombosis (blockage or narrowing of the portal vein): Yes/No  Muscle Mass Loss: Yes/No  Weight Loss: Yes/No  Sleeping habits: -- hours per night.

## 2024-06-03 NOTE — HPI-ELEVATED LIVER ENZYMES
Patient present today for a 6 month follow-up of elevated liver enzymes. He denies any recent episodes od jaundice, dizziness, RUQ pain, bloating, easy bruising or chills. Patient admits symptoms of fatigue.   Most recent labs were performed 05.24.202 with results listed below.    (Last Visit 11.20.2023) Patient present today for a 3 month follow-up of elevated liver enzymes. He denies any recent episodes of jaundice,  dizziness, RUQ pain, bloating, easy bruising or chills. Patient states he does experience fatigue on occasions.   Most recent labs were performed 07.06.2023 which revealed Calcium: 10.4H, Bilirubin,Total: 2.1H, Alkaline Phosphatase: 175H, AST: 67H, ALT: 63H, all other labs were normal.   (Last Visit 05.09.2023) Patient present today for 3 month follow-up of  elevated liver enzymes. He denies any associated symptoms of  jaundice, fatigue, dizziness, RUQ pain, bloating, easy bruising or chills. Patient admits symptoms of joint pain and muscle fatigue. he states the joint pain and muscle fatigue is mainly present in his quads and shoulders.  Patient states he noticed the symptoms aout 5-6 weeks ago. Patient states he was diagnose with Gilbert's Syndrome back in 1984.   Most recent labs were performed 05.01.2023 and 05.04.2023 which reevealed Creatinine: 0.60L, BUN/Creatinie Ratio: 30H, Bilirubin:2.1H, AST: 45H, INR: 1.2H, PT: 12.4H, Hepatitis A AB, total: Reactive, all other labs were normal.   (Last Visit 02.27.2023) 51 y/o male patient presents today for a consultation for abnormal labs, referred by his PCP -  DAHIANA Wheeler .  He denies any previous history of elevated liver enzymes. Patient admits a history of being an alcoholic. He drank 18 drinks for 6 weeks till he entered rehab. Patient states he has enrolled in a detox center where he received treatment for two weeks. Patient states he is currently living in a sobriety center. Patient states his last alcoholic drink was 12.05.2022.    He currently denies any associated symptoms of jaundice, fatigue, dizziness, RUQ pain, bloating, easy bruising or chills. Patient admits symptoms of fatigue, he states his fatigue may be triggered from not having enough rest. Patient states he had a severe allergic reaction to Gabapentin. Patiemt states after consuming the medication he experience severe symptoms of Jaundice and edema in his legs. Patient states after stopping the medication his returned back to normal.   He admits having an ultrasound completed on 01.06.2023 revealing Enlarge, nodular liver woth coarsened echotexture and small volume ascites concerning for cirrhosis. If further assessment of the liver is desired recommend MRI, as MRI is much more sensitive for underlying liver lesions than ultrasounfd, especially in the setting of cirrhosis. Borderline gallbladder wall thickening. Other preserved appearance to the gallbladder with no findigns to suggest biliary obstruction. Ifcomplimentary assessment is desired consider MRI.    Most recent labs were performed 12.29.2022 with findings of WBC - 12.9 (H), RBC - 3.68 (L), Hgb - 12.8 (L), HCT - 35.3 (L), MCH - 34.8 (H), MCHC - 36.1 (H), Neutrophils - 10.1 (H), Glucose - 148 (H), Sodium - 132 (L), Albumin - 3.1 (L), Globulin - 4.6 (H), A/G Ratio - 0.7 (L), Bilirubin - 10.9 (H), ALK Phos - 154 (H), AST - 293 (H), ALT - 149 (H), Vitamin B12 2000 (H), TSH - 5.880 (H), GGT - 144 (H).   Patient also had some labs completed on 01.28.2023 revealing GGT: 117H, Hgb A1c: 5.7H, Glucose: 103H, Creatinine: 0.72L, Calcium: 10.05H, A/G Ratio: 1.0L, Bilirubin, Total: 3.5H, Alk Phosphatase: 145H, AST: 84H, ALT: 67H, all other labs were normal.    RISK FACTORS: Denies Alcohol use, drug use, travel outside the [place], NSAIDs, protein supplements, tattoos, piercings,  service, blood transfusion, family history of liver disease or cancer, personal exposure to liver diseases, skilled nursing, rehab

## 2024-07-08 ENCOUNTER — LAB OUTSIDE AN ENCOUNTER (OUTPATIENT)
Dept: URBAN - METROPOLITAN AREA CLINIC 33 | Facility: CLINIC | Age: 54
End: 2024-07-08

## 2024-07-12 LAB
A/G RATIO: 1.5
ALBUMIN: 4.2
ALKALINE PHOSPHATASE: 91
ALT (SGPT): 22
AST (SGOT): 26
BILIRUBIN, DIRECT: 0.3
BILIRUBIN, TOTAL: 1.5
BUN/CREATININE RATIO: (no result)
BUN: 14
CALCIUM: 9.8
CARBON DIOXIDE, TOTAL: 28
CHLORIDE: 104
CREATININE: 0.76
EGFR: 107
GLOBULIN, TOTAL: 2.8
GLUCOSE: 81
POTASSIUM: 4.6
PROTEIN, TOTAL: 7
SODIUM: 140

## 2024-09-09 ENCOUNTER — OFFICE VISIT (OUTPATIENT)
Dept: URBAN - METROPOLITAN AREA CLINIC 33 | Facility: CLINIC | Age: 54
End: 2024-09-09
Payer: COMMERCIAL

## 2024-09-09 VITALS
DIASTOLIC BLOOD PRESSURE: 64 MMHG | HEART RATE: 58 BPM | HEIGHT: 70 IN | BODY MASS INDEX: 26.05 KG/M2 | WEIGHT: 182 LBS | OXYGEN SATURATION: 98 % | SYSTOLIC BLOOD PRESSURE: 110 MMHG

## 2024-09-09 DIAGNOSIS — D64.89 ANEMIA DUE TO OTHER CAUSE, NOT CLASSIFIED: ICD-10-CM

## 2024-09-09 DIAGNOSIS — I85.00 ESOPHAGEAL VARICES DETERMINED BY ENDOSCOPY: ICD-10-CM

## 2024-09-09 DIAGNOSIS — K57.30 DIVERTICULOSIS OF SIGMOID COLON: ICD-10-CM

## 2024-09-09 DIAGNOSIS — K64.4 RESIDUAL HEMORRHOIDAL SKIN TAGS: ICD-10-CM

## 2024-09-09 DIAGNOSIS — D12.0 BENIGN NEOPLASM OF CECUM: ICD-10-CM

## 2024-09-09 DIAGNOSIS — D36.9 ADENOMATOUS POLYP: ICD-10-CM

## 2024-09-09 DIAGNOSIS — K70.30 ALCOHOLIC CIRRHOSIS OF LIVER WITHOUT ASCITES: ICD-10-CM

## 2024-09-09 PROBLEM — 31704005: Status: ACTIVE | Noted: 2024-09-09

## 2024-09-09 PROCEDURE — 99213 OFFICE O/P EST LOW 20 MIN: CPT | Performed by: INTERNAL MEDICINE

## 2024-09-09 RX ORDER — CARVEDILOL 3.12 MG/1
1 TABLET WITH FOOD TABLET, FILM COATED ORAL TWICE A DAY
Qty: 180 TABLET | Refills: 3 | Status: ACTIVE | COMMUNITY

## 2024-09-09 RX ORDER — AMLODIPINE BESYLATE 2.5 MG/1
1 TABLET TABLET ORAL ONCE A DAY
Status: ACTIVE | COMMUNITY

## 2024-09-09 RX ORDER — LEVOTHYROXINE SODIUM 88 UG/1
1 TABLET IN THE MORNING ON AN EMPTY STOMACH TABLET ORAL ONCE A DAY
Status: ACTIVE | COMMUNITY

## 2024-09-09 RX ORDER — CARVEDILOL 3.12 MG/1
1 TABLET WITH FOOD TABLET, FILM COATED ORAL TWICE A DAY
Qty: 60 | Refills: 5 | Status: ACTIVE | COMMUNITY

## 2024-09-09 RX ORDER — ALIROCUMAB 75 MG/ML
AS DIRECTED INJECTION, SOLUTION SUBCUTANEOUS
Status: ON HOLD | COMMUNITY

## 2024-09-09 RX ORDER — CARVEDILOL 3.12 MG/1
1 TABLET WITH FOOD TABLET, FILM COATED ORAL TWICE A DAY
Qty: 60 | Refills: 5 | OUTPATIENT

## 2024-09-09 RX ORDER — PRASUGREL 10 MG/1
AS DIRECTED TABLET, FILM COATED ORAL
Status: ACTIVE | COMMUNITY

## 2024-09-09 NOTE — HPI-ELEVATED LIVER ENZYMES
Patient present today for a follow-up for elevated liver enzymes.  Labs were completed on 07/11/2024 and the results are in EMR. He denies any recent episodes of jaundice, dizziness, RUQ pain, bloating, easy bruising or chills. Patient admits symptoms of fatigue.   (Last Visit 06/03/2024) Patient present today for a 6-month follow-up of elevated liver enzymes. He denies any recent episodes of jaundice, dizziness, RUQ pain, bloating, easy bruising or chills. Patient admits symptoms of fatigue.   Most recent labs were performed 05.24.202 with results listed below.    (Last Visit 11.20.2023) Patient present today for a 3-month follow-up of elevated liver enzymes. He denies any recent episodes of jaundice,  dizziness, RUQ pain, bloating, easy bruising or chills. Patient states he does experience fatigue on occasions.   Most recent labs were performed 07.06.2023 which revealed Calcium: 10.4H, Bilirubin, Total: 2.1H, Alkaline Phosphatase: 175H, AST: 67H, ALT: 63H, all other labs were normal.   (Last Visit 05.09.2023) Patient present today for 3 month follow-up of  elevated liver enzymes. He denies any associated symptoms of  jaundice, fatigue, dizziness, RUQ pain, bloating, easy bruising or chills. Patient admits symptoms of joint pain and muscle fatigue. he states the joint pain and muscle fatigue is mainly present in his quads and shoulders.  Patient states he noticed the symptoms about 5-6 weeks ago. Patient states he was diagnosed with Gilbert's Syndrome back in 1984.   Most recent labs were performed 05.01.2023 and 05.04.2023 which revealed Creatinine: 0.60L, BUN/Creatinie Ratio: 30H, Bilirubin:2.1H, AST: 45H, INR: 1.2H, PT: 12.4H, Hepatitis A AB, total: Reactive, all other labs were normal.   (Last Visit 02.27.2023) 51 y/o male patient presents today for a consultation for abnormal labs, referred by his PCP -  DAHIANA Wheeler.  He denies any previous history of elevated liver enzymes. Patient admits a history of being an alcoholic. He drank 18 drinks for 6 weeks till he entered rehab. Patient states he has enrolled in a detox center where he received treatment for two weeks. Patient states he is currently living in a sobriety center. Patient states his last alcoholic drink was 12.05.2022.    He currently denies any associated symptoms of jaundice, fatigue, dizziness, RUQ pain, bloating, easy bruising or chills. Patient admits symptoms of fatigue, he states his fatigue may be triggered from not having enough rest. Patient states he had a severe allergic reaction to Gabapentin. Patiemt states after consuming the medication he experience severe symptoms of Jaundice and edema in his legs. Patient states after stopping the medication his returned back to normal.   He admits having an ultrasound completed on 01.06.2023 revealing Enlarge, nodular liver woth coarsened echotexture and small volume ascites concerning for cirrhosis. If further assessment of the liver is desired recommend MRI, as MRI is much more sensitive for underlying liver lesions than ultrasounfd, especially in the setting of cirrhosis. Borderline gallbladder wall thickening. Other preserved appearance to the gallbladder with no findigns to suggest biliary obstruction. Ifcomplimentary assessment is desired consider MRI.    Most recent labs were performed 12.29.2022 with findings of WBC - 12.9 (H), RBC - 3.68 (L), Hgb - 12.8 (L), HCT - 35.3 (L), MCH - 34.8 (H), MCHC - 36.1 (H), Neutrophils - 10.1 (H), Glucose - 148 (H), Sodium - 132 (L), Albumin - 3.1 (L), Globulin - 4.6 (H), A/G Ratio - 0.7 (L), Bilirubin - 10.9 (H), ALK Phos - 154 (H), AST - 293 (H), ALT - 149 (H), Vitamin B12 2000 (H), TSH - 5.880 (H), GGT - 144 (H).   Patient also had some labs completed on 01.28.2023 revealing GGT: 117H, Hgb A1c: 5.7H, Glucose: 103H, Creatinine: 0.72L, Calcium: 10.05H, A/G Ratio: 1.0L, Bilirubin, Total: 3.5H, Alk Phosphatase: 145H, AST: 84H, ALT: 67H, all other labs were normal.    RISK FACTORS: Denies Alcohol use, drug use, travel outside the [place], NSAIDs, protein supplements, tattoos, piercings,  service, blood transfusion, family history of liver disease or cancer, personal exposure to liver diseases, snf, rehab

## 2024-09-09 NOTE — HPI-CIRRHOSIS
Labs were completed on 07/11/2024 and the results are in EMR. Patient did not get the ultrasound done yet because it was set to do in December. He scheduled today's appointment for a different issue. Patient denies any associated symptoms of fatigue, easy bruising, decreased appetite, nausea, vomiting, edema, unintentional weight loss, shortness of breath, or jaundice. NO MELD score could be calculated from the recent labs, as no PT/INR was ordered.  MELD Score: 11 Complications of liver disease include: Yes/No Jaundice: Yes/No Hepatic Encephalopathy (lose of Brain function due to liver not working) :  Yes/No  Ascites (abdominal fluid):  Yes/No  Spontaneous Bacterial Peritonitis (ascites fluid infection):  Yes/No  Variceal hemorrhage (bleeding within the veins due to high B/P):  Yes/No  Portal Vein Thrombosis (blockage or narrowing of the portal vein):  Yes/No  Muscle Mass Loss:  Yes/No  Weight Loss:  Yes/No  Sleeping habits: --- hours per night.    (Last Visit 06/03/2024) He denies any associated symptoms of fatigue, easy bruising, decreased appetite, nausea, vomiting, edema, unintentional weight loss, shortness of breath, or jaundice.   Most recent labs were completed 05.24.2024 with results listed below.     MELD Score: 11 Complications of liver disease include: No Jaundice: No Hepatic Encephalopathy (lose of Brain function due to liver not working) : No  Ascites (abdominal fluid): No  Spontaneous Bacterial Peritonitis (ascites fluid infection): No  Variceal hemorrhage (bleeding within the veins due to high B/P): No  Portal Vein Thrombosis (blockage or narrowing of the portal vein): No  Muscle Mass Loss: No  Weight Loss: No  Sleeping habits: 7-71/2 hours per night.    (Last Visit 11.20.2023) Patient states he has not had any recent episodes of fatigue, easy bruising, decreased appetite, nausea, vomiting, edema, unintentional weight loss, or jaundice.  Admits/Denies any shortness of breath.    Most recent labs were completed  07.06.2023 which revealed Calcium: 10.4H, Bilirubin,Total: 2.1H, Alkaline Phosphatase: 175H, AST: 67H, ALT: 63H, all other labs were normal.     MELD Score:11 (last visit) Complications of liver disease include:No Jaundice:No Hepatic Encephalopathy (lose of Brain function due to liver not working) : No  Ascites (abdominal fluid): No  Spontaneous Bacterial Peritonitis (ascites fluid infection): No  Variceal hemorrhage (bleeding within the veins due to high B/P): No  Portal Vein Thrombosis (blockage or narrowing of the portal vein): No  Muscle Mass Loss: No  Weight Loss: No  Sleeping habits: 7-71/2 hours per night.      (Last Visit 05.09.2023) He denies any recent episodes of fatigue, easy bruising, decreased appetite, nausea, vomiting, edema, unintentional weight loss, or jaundice. Denies any shortness of breath.    Most recent labs were completed 05.01.2023 and 05.04.2023 which reevealed Creatinine: 0.60L, BUN/Creatinie Ratio: 30H, Bilirubin:2.1H, AST: 45H, INR: 1.2H, PT: 12.4H, Hepatitis A AB, total: Reactive, all other labs were normal.       MELD Score: 11 Complications of liver disease include:  Jaundice:  Yes/No Hepatic Encephalopathy (lose of Brain function due to liver not working) : Yes/No  Ascites (abdominal fluid): Yes/No  Spontaneous Bacterial Peritonitis (ascites fluid infection): Yes/No  Variceal hemorrhage (bleeding within the veins due to high B/P): Yes/No  Portal Vein Thrombosis (blockage or narrowing of the portal vein): Yes/No  Muscle Mass Loss: Yes/No  Weight Loss: Yes/No  Sleeping habits: -- hours per night.

## 2024-09-09 NOTE — PHYSICAL EXAM GASTROINTESTINAL
Abdomen , soft, nontender, nondistended , no guarding or rigidity , no masses palpable , normal bowel sounds , Liver and Spleen,  no hepatosplenomegaly , liver nontender. Anal spasm Perianal skin tag ( large) .

## 2024-09-09 NOTE — HPI-RECTAL BLEEDING
Patient presents today for possible hemorrhoid issues.  Patient states he has discomfort in his rectal are.  Patient states the symptoms started 3 weeks.  Patient states he has been having some rectal bleeding through his underwear.  He notices when he wipes with tissue.  He notices the  tissue has bright red blood stains. He states occasionally he will have blood in toilet; a few drops. He only notices the blood on tissues when he cleans himself after he has a bowel movement and when he  works out.  (weight lifting 3 times a week staining lifting and holding the position with weights; 5 days a week cardio workout regimen)  He has 2 bowel movements a day.  He states he is not straining with bowel movement. He denies any mucus or melena in stools. He admits any rectal discomfort and denies pruritus ani. Denies any associated abdominal pain or cramping.  Patient is requesting hemorrhoid band.

## 2024-11-11 ENCOUNTER — OFFICE VISIT (OUTPATIENT)
Dept: URBAN - METROPOLITAN AREA CLINIC 33 | Facility: CLINIC | Age: 54
End: 2024-11-11
Payer: COMMERCIAL

## 2024-11-11 VITALS
OXYGEN SATURATION: 98 % | HEART RATE: 64 BPM | BODY MASS INDEX: 27.06 KG/M2 | HEIGHT: 70 IN | SYSTOLIC BLOOD PRESSURE: 112 MMHG | WEIGHT: 189 LBS | DIASTOLIC BLOOD PRESSURE: 72 MMHG

## 2024-11-11 DIAGNOSIS — K57.30 DIVERTICULOSIS OF SIGMOID COLON: ICD-10-CM

## 2024-11-11 DIAGNOSIS — K64.4 RESIDUAL HEMORRHOIDAL SKIN TAGS: ICD-10-CM

## 2024-11-11 DIAGNOSIS — I85.00 ESOPHAGEAL VARICES DETERMINED BY ENDOSCOPY: ICD-10-CM

## 2024-11-11 DIAGNOSIS — D12.0 BENIGN NEOPLASM OF CECUM: ICD-10-CM

## 2024-11-11 DIAGNOSIS — K70.30 ALCOHOLIC CIRRHOSIS OF LIVER WITHOUT ASCITES: ICD-10-CM

## 2024-11-11 DIAGNOSIS — D64.89 ANEMIA DUE TO OTHER CAUSE, NOT CLASSIFIED: ICD-10-CM

## 2024-11-11 DIAGNOSIS — D36.9 ADENOMATOUS POLYP: ICD-10-CM

## 2024-11-11 PROCEDURE — 99214 OFFICE O/P EST MOD 30 MIN: CPT | Performed by: INTERNAL MEDICINE

## 2024-11-11 RX ORDER — CARVEDILOL 3.12 MG/1
1 TABLET WITH FOOD TABLET, FILM COATED ORAL TWICE A DAY
Qty: 60 | Refills: 5 | Status: ACTIVE | COMMUNITY

## 2024-11-11 RX ORDER — LEVOTHYROXINE SODIUM 88 UG/1
1 TABLET IN THE MORNING ON AN EMPTY STOMACH TABLET ORAL ONCE A DAY
Status: ON HOLD | COMMUNITY

## 2024-11-11 RX ORDER — CARVEDILOL 3.12 MG/1
1 TABLET WITH FOOD TABLET, FILM COATED ORAL
Qty: 90 | Refills: 3 | OUTPATIENT

## 2024-11-11 RX ORDER — AMLODIPINE BESYLATE 2.5 MG/1
1 TABLET TABLET ORAL ONCE A DAY
Status: ON HOLD | COMMUNITY

## 2024-11-11 RX ORDER — CARVEDILOL 3.12 MG/1
1 TABLET WITH FOOD TABLET, FILM COATED ORAL TWICE A DAY
Qty: 180 TABLET | Refills: 3 | Status: ACTIVE | COMMUNITY

## 2024-11-11 RX ORDER — PRASUGREL 10 MG/1
AS DIRECTED TABLET, FILM COATED ORAL
Status: ON HOLD | COMMUNITY

## 2024-11-11 RX ORDER — ALIROCUMAB 75 MG/ML
AS DIRECTED INJECTION, SOLUTION SUBCUTANEOUS
Status: ACTIVE | COMMUNITY

## 2024-11-11 NOTE — HPI-RECTAL BLEEDING
Patient denies seeing the colorectal surgeon. He denies recent rectal bleeding episodes.   Last visit (09/09/2024) Patient presents today for possible hemorrhoid issues.  Patient states he has discomfort in his rectal are.  Patient states the symptoms started 3 weeks.  Patient states he has been having some rectal bleeding through his underwear.  He notices when he wipes with tissue.  He notices the  tissue has bright red blood stains. He states occasionally he will have blood in toilet; a few drops. He only notices the blood on tissues when he cleans himself after he has a bowel movement and when he  works out.  (weight lifting 3 times a week staining lifting and holding the position with weights; 5 days a week cardio workout regimen)  He has 2 bowel movements a day.  He states he is not straining with bowel movement. He denies any mucus or melena in stools. He admits any rectal discomfort and denies pruritus ani. Denies any associated abdominal pain or cramping.  Patient is requesting hemorrhoid band.

## 2024-11-11 NOTE — HPI-CIRRHOSIS
Patient presents today for a follow-up for cirrhosis. Patient completed the MRI on 10/03/2024 and the results are in EMR. There was an order for a RUQ ultrasound (from visit in June), however, it was patient's understanding that order was to be done in the future. Patient denies any associated symptoms of fatigue, easy bruising, decreased appetite, nausea, vomiting, edema, unintentional weight loss, shortness of breath, or jaundice.   MELD Score:  Original MELD-10 and MELD 3.0-10 Complications of liver disease include: Yes/No Jaundice: No Hepatic Encephalopathy (lose of Brain function due to liver not working) : No Ascites (abdominal fluid): No Spontaneous Bacterial Peritonitis (ascites fluid infection): No Variceal hemorrhage (bleeding within the veins due to high B/P): No Portal Vein Thrombosis (blockage or narrowing of the portal vein): No Muscle Mass Loss: No Weight Loss: No Sleeping habits: 7hours per night.   Lab results from 11/08/2024 were as follows :  PT was NORMAL @ 14.7 and INR was NORMAL @ 1.1.    Lab results from 10/23/2024 were as follows :  Chem profile, all results were normal except for the following, bilirubin, total was HIGH @ 2.0, AST was HIGH @ 40. Lipid panel, all results were normal except for the following, HDL was HIGH @ 75, CK was HIGH @ 181.    MRI ABDOMEN AND PELVIS W/W/OUT CONTRAST done on 10/03/2024 :  IMPRESSION : Nodular contour of the liver compatible with cirrhosis, progressed since 4/7/2023. Normal signal intensity. Peripheral areas of T2 hyperintense reticulation with delayed phase enhancement compatible with fibrosis. No suspicious lesions. Periumbilical and perisplenic varices  Last visit (09/09/2024) Labs were completed on 07/11/2024 and the results are in EMR. Patient did not get the ultrasound done yet because it was set to do in December. He scheduled today's appointment for a different issue. Patient denies any associated symptoms of fatigue, easy bruising, decreased appetite, nausea, vomiting, edema, unintentional weight loss, shortness of breath, or jaundice. NO MELD score could be calculated from the recent labs, as no PT/INR was ordered.   (Last Visit 06/03/2024) He denies any associated symptoms of fatigue, easy bruising, decreased appetite, nausea, vomiting, edema, unintentional weight loss, shortness of breath, or jaundice.   Most recent labs were completed 05.24.2024 with results listed below.     MELD Score: 11 Complications of liver disease include: No Jaundice: No Hepatic Encephalopathy (lose of Brain function due to liver not working) : No  Ascites (abdominal fluid): No  Spontaneous Bacterial Peritonitis (ascites fluid infection): No  Variceal hemorrhage (bleeding within the veins due to high B/P): No  Portal Vein Thrombosis (blockage or narrowing of the portal vein): No  Muscle Mass Loss: No  Weight Loss: No  Sleeping habits: 7-71/2 hours per night.    (Last Visit 11.20.2023) Patient states he has not had any recent episodes of fatigue, easy bruising, decreased appetite, nausea, vomiting, edema, unintentional weight loss, or jaundice.  Admits/Denies any shortness of breath.    Most recent labs were completed  07.06.2023 which revealed Calcium: 10.4H, Bilirubin,Total: 2.1H, Alkaline Phosphatase: 175H, AST: 67H, ALT: 63H, all other labs were normal.     MELD Score:11 (last visit) Complications of liver disease include:No Jaundice:No Hepatic Encephalopathy (lose of Brain function due to liver not working) : No  Ascites (abdominal fluid): No  Spontaneous Bacterial Peritonitis (ascites fluid infection): No  Variceal hemorrhage (bleeding within the veins due to high B/P): No  Portal Vein Thrombosis (blockage or narrowing of the portal vein): No  Muscle Mass Loss: No  Weight Loss: No  Sleeping habits: 7-71/2 hours per night.      (Last Visit 05.09.2023) He denies any recent episodes of fatigue, easy bruising, decreased appetite, nausea, vomiting, edema, unintentional weight loss, or jaundice. Denies any shortness of breath.    Most recent labs were completed 05.01.2023 and 05.04.2023 which reevealed Creatinine: 0.60L, BUN/Creatinie Ratio: 30H, Bilirubin:2.1H, AST: 45H, INR: 1.2H, PT: 12.4H, Hepatitis A AB, total: Reactive, all other labs were normal.       MELD Score: 11 Complications of liver disease include:  Jaundice:  Yes/No Hepatic Encephalopathy (lose of Brain function due to liver not working) : Yes/No  Ascites (abdominal fluid): Yes/No  Spontaneous Bacterial Peritonitis (ascites fluid infection): Yes/No  Variceal hemorrhage (bleeding within the veins due to high B/P): Yes/No  Portal Vein Thrombosis (blockage or narrowing of the portal vein): Yes/No  Muscle Mass Loss: Yes/No  Weight Loss: Yes/No  Sleeping habits: -- hours per night.

## 2024-12-02 ENCOUNTER — LAB OUTSIDE AN ENCOUNTER (OUTPATIENT)
Dept: URBAN - METROPOLITAN AREA CLINIC 33 | Facility: CLINIC | Age: 54
End: 2024-12-02

## 2025-05-05 ENCOUNTER — LAB OUTSIDE AN ENCOUNTER (OUTPATIENT)
Dept: URBAN - METROPOLITAN AREA CLINIC 33 | Facility: CLINIC | Age: 55
End: 2025-05-05

## 2025-05-12 ENCOUNTER — LAB OUTSIDE AN ENCOUNTER (OUTPATIENT)
Dept: URBAN - METROPOLITAN AREA CLINIC 33 | Facility: CLINIC | Age: 55
End: 2025-05-12

## 2025-05-22 LAB
A/G RATIO: 1.9
AFP, SERUM, TUMOR MARKER: 2.9
ALBUMIN: 4.6
ALKALINE PHOSPHATASE: 64
ALT (SGPT): 18
AST (SGOT): 26
BILIRUBIN, TOTAL: 1.6
BUN/CREATININE RATIO: (no result)
BUN: 18
CALCIUM: 10.1
CARBON DIOXIDE, TOTAL: 26
CHLORIDE: 105
CREATININE: 0.82
EGFR: 104
GLOBULIN, TOTAL: 2.4
GLUCOSE: 82
INR: 1.1
POTASSIUM: 4.8
PROTEIN, TOTAL: 7
PT: 11.2
SODIUM: 140

## 2025-05-29 ENCOUNTER — OFFICE VISIT (OUTPATIENT)
Dept: URBAN - METROPOLITAN AREA CLINIC 33 | Facility: CLINIC | Age: 55
End: 2025-05-29
Payer: COMMERCIAL

## 2025-05-29 ENCOUNTER — TELEPHONE ENCOUNTER (OUTPATIENT)
Dept: URBAN - METROPOLITAN AREA CLINIC 35 | Facility: CLINIC | Age: 55
End: 2025-05-29

## 2025-05-29 DIAGNOSIS — D12.0 BENIGN NEOPLASM OF CECUM: ICD-10-CM

## 2025-05-29 DIAGNOSIS — I85.00 ESOPHAGEAL VARICES DETERMINED BY ENDOSCOPY: ICD-10-CM

## 2025-05-29 DIAGNOSIS — K57.30 DIVERTICULOSIS OF SIGMOID COLON: ICD-10-CM

## 2025-05-29 DIAGNOSIS — K64.4 RESIDUAL HEMORRHOIDAL SKIN TAGS: ICD-10-CM

## 2025-05-29 DIAGNOSIS — K70.30 ALCOHOLIC CIRRHOSIS OF LIVER WITHOUT ASCITES: ICD-10-CM

## 2025-05-29 DIAGNOSIS — D64.89 ANEMIA DUE TO OTHER CAUSE, NOT CLASSIFIED: ICD-10-CM

## 2025-05-29 DIAGNOSIS — D36.9 ADENOMATOUS POLYP: ICD-10-CM

## 2025-05-29 PROCEDURE — 99214 OFFICE O/P EST MOD 30 MIN: CPT | Performed by: INTERNAL MEDICINE

## 2025-05-29 RX ORDER — PRASUGREL 10 MG/1
AS DIRECTED TABLET, FILM COATED ORAL
Status: ON HOLD | COMMUNITY

## 2025-05-29 RX ORDER — CARVEDILOL 3.12 MG/1
1 TABLET WITH FOOD TABLET, FILM COATED ORAL
Qty: 90 | Refills: 3 | Status: ACTIVE | COMMUNITY

## 2025-05-29 RX ORDER — LEVOTHYROXINE SODIUM 88 UG/1
1 TABLET IN THE MORNING ON AN EMPTY STOMACH TABLET ORAL ONCE A DAY
Status: ON HOLD | COMMUNITY

## 2025-05-29 RX ORDER — CARVEDILOL 3.12 MG/1
1 TABLET WITH FOOD TABLET, FILM COATED ORAL
Qty: 60 | Refills: 3 | OUTPATIENT
Start: 2025-05-28

## 2025-05-29 RX ORDER — AMLODIPINE BESYLATE 2.5 MG/1
1 TABLET TABLET ORAL ONCE A DAY
Status: ON HOLD | COMMUNITY

## 2025-05-29 RX ORDER — ALIROCUMAB 75 MG/ML
AS DIRECTED INJECTION, SOLUTION SUBCUTANEOUS
Status: ACTIVE | COMMUNITY

## 2025-05-29 NOTE — HPI-RECTAL BLEEDING
Patient admits seeing the colorectal surgeon. He denies recent rectal bleeding episodes.   Last visit (11/11/2024) Patient denies seeing the colorectal surgeon. He denies recent rectal bleeding episodes.   Last visit (09/09/2024) Patient presents today for possible hemorrhoid issues.  Patient states he has discomfort in his rectal area.  Patient states the symptoms started 3 weeks.  Patient states he has been having some rectal bleeding through his underwear.  He notices when he wipes with tissue.  He notices the  tissue has bright red blood stains. He states occasionally he will have blood in toilet; a few drops. He only notices the blood on tissues when he cleans himself after he has a bowel movement and when he  works out.  (weight lifting 3 times a week staining lifting and holding the position with weights; 5 days a week cardio workout regimen)  He has 2 bowel movements a day.  He states he is not straining with bowel movement. He denies any mucus or melena in stools. He admits any rectal discomfort and denies pruritus ani. Denies any associated abdominal pain or cramping.  Patient is requesting hemorrhoid band.

## 2025-05-29 NOTE — HPI-CIRRHOSIS
Patient presents today for a follow-up for cirrhosis. Patient completed the RUQ ultrasound on 05/22/2025 and the results are in EMR. Labs were comepleted on 05/21/2025 and those results are also in EMR. Patient denies any associated symptoms of fatigue, easy bruising, decreased appetite, nausea, vomiting, edema, unintentional weight loss, shortness of breath, or jaundice.   RUQ ultrasound LTD done on 05/22/2025-IMPRESSION : Cirrhotic morphology of the liver correlating with the earlier MR. More definite evaluation with repeat hepatic MR without and with contrast can be performed as clinically indicated. The gallbladder is distended without evidence of cholelithiasis.  MELD Score:  Original MELD-9 and MELD 3.0-9 Complications of liver disease include: Yes/No Jaundice: No Hepatic Encephalopathy (lose of Brain function due to liver not working) : No Ascites (abdominal fluid): No Spontaneous Bacterial Peritonitis (ascites fluid infection): No Variceal hemorrhage (bleeding within the veins due to high B/P): No Portal Vein Thrombosis (blockage or narrowing of the portal vein): No Muscle Mass Loss: No Weight Loss: No Sleeping habits: 7hours per night.  Last visit (11/11/2024) Patient presents today for a follow-up for cirrhosis. Patient completed the MRI on 10/03/2024 and the results are in EMR. There was an order for a RUQ ultrasound (from visit in June), however, it was patient's understanding that order was to be done in the future. Patient denies any associated symptoms of fatigue, easy bruising, decreased appetite, nausea, vomiting, edema, unintentional weight loss, shortness of breath, or jaundice.   MELD Score:  Original MELD-10 and MELD 3.0-10 Complications of liver disease include: Yes/No Jaundice: No Hepatic Encephalopathy (lose of Brain function due to liver not working) : No Ascites (abdominal fluid): No Spontaneous Bacterial Peritonitis (ascites fluid infection): No Variceal hemorrhage (bleeding within the veins due to high B/P): No Portal Vein Thrombosis (blockage or narrowing of the portal vein): No Muscle Mass Loss: No Weight Loss: No Sleeping habits: 7hours per night.   Lab results from 11/08/2024 were as follows :  PT was NORMAL @ 14.7 and INR was NORMAL @ 1.1.    Lab results from 10/23/2024 were as follows :  Chem profile, all results were normal except for the following, bilirubin, total was HIGH @ 2.0, AST was HIGH @ 40. Lipid panel, all results were normal except for the following, HDL was HIGH @ 75, CK was HIGH @ 181.    MRI ABDOMEN AND PELVIS W/W/OUT CONTRAST done on 10/03/2024 :  IMPRESSION : Nodular contour of the liver compatible with cirrhosis, progressed since 4/7/2023. Normal signal intensity. Peripheral areas of T2 hyperintense reticulation with delayed phase enhancement compatible with fibrosis. No suspicious lesions. Periumbilical and perisplenic varices  Last visit (09/09/2024) Labs were completed on 07/11/2024 and the results are in EMR. Patient did not get the ultrasound done yet because it was set to do in December. He scheduled today's appointment for a different issue. Patient denies any associated symptoms of fatigue, easy bruising, decreased appetite, nausea, vomiting, edema, unintentional weight loss, shortness of breath, or jaundice. NO MELD score could be calculated from the recent labs, as no PT/INR was ordered.   (Last Visit 06/03/2024) He denies any associated symptoms of fatigue, easy bruising, decreased appetite, nausea, vomiting, edema, unintentional weight loss, shortness of breath, or jaundice.   Most recent labs were completed 05.24.2024 with results listed below.     MELD Score: 11 Complications of liver disease include: No Jaundice: No Hepatic Encephalopathy (lose of Brain function due to liver not working) : No  Ascites (abdominal fluid): No  Spontaneous Bacterial Peritonitis (ascites fluid infection): No  Variceal hemorrhage (bleeding within the veins due to high B/P): No  Portal Vein Thrombosis (blockage or narrowing of the portal vein): No  Muscle Mass Loss: No  Weight Loss: No  Sleeping habits: 7-71/2 hours per night.    (Last Visit 11.20.2023) Patient states he has not had any recent episodes of fatigue, easy bruising, decreased appetite, nausea, vomiting, edema, unintentional weight loss, or jaundice.  Admits/Denies any shortness of breath.    Most recent labs were completed  07.06.2023 which revealed Calcium: 10.4H, Bilirubin,Total: 2.1H, Alkaline Phosphatase: 175H, AST: 67H, ALT: 63H, all other labs were normal.     MELD Score:11 (last visit) Complications of liver disease include:No Jaundice:No Hepatic Encephalopathy (lose of Brain function due to liver not working) : No  Ascites (abdominal fluid): No  Spontaneous Bacterial Peritonitis (ascites fluid infection): No  Variceal hemorrhage (bleeding within the veins due to high B/P): No  Portal Vein Thrombosis (blockage or narrowing of the portal vein): No  Muscle Mass Loss: No  Weight Loss: No  Sleeping habits: 7-71/2 hours per night.      (Last Visit 05.09.2023) He denies any recent episodes of fatigue, easy bruising, decreased appetite, nausea, vomiting, edema, unintentional weight loss, or jaundice. Denies any shortness of breath.    Most recent labs were completed 05.01.2023 and 05.04.2023 which reevealed Creatinine: 0.60L, BUN/Creatinie Ratio: 30H, Bilirubin:2.1H, AST: 45H, INR: 1.2H, PT: 12.4H, Hepatitis A AB, total: Reactive, all other labs were normal.   MELD Score: 11 Complications of liver disease include:no complication to reports. Jaundice: No Hepatic Encephalopathy (lose of Brain function due to liver not working) : No  Ascites (abdominal fluid): No  Spontaneous Bacterial Peritonitis (ascites fluid infection): No  Variceal hemorrhage (bleeding within the veins due to high B/P): No  Portal Vein Thrombosis (blockage or narrowing of the portal vein): No  Muscle Mass Loss: No  Weight Loss: No  Sleeping habits: 6  hours per night.

## 2025-08-15 ENCOUNTER — ERX REFILL RESPONSE (OUTPATIENT)
Dept: URBAN - METROPOLITAN AREA CLINIC 33 | Facility: CLINIC | Age: 55
End: 2025-08-15

## 2025-08-15 RX ORDER — CARVEDILOL 3.12 MG/1
1 TABLET WITH FOOD TABLET, FILM COATED ORAL
Qty: 60 | Refills: 3 | OUTPATIENT

## 2025-08-15 RX ORDER — CARVEDILOL 3.12 MG/1
1 TABLET WITH FOOD TABLET, FILM COATED ORAL
Qty: 180 | Refills: 3 | OUTPATIENT